# Patient Record
(demographics unavailable — no encounter records)

---

## 2025-02-19 NOTE — DISCUSSION/SUMMARY
[de-identified] : Right elbow region pain  HPI Patient is a 51-year-old female reports to office for evaluation of her pain in her right elbow region since 2/13/2025.  She had blood work drawn on that day where she states the needle was stuck by her biceps area.  This caused severe pain afterwards.  Her right elbow region has been bruised and swollen ever since.  Lifting, range of motion, and palpating certain areas aggravate the patient's pain.  She went to an urgent care where they diagnosed her with phlebitis.  Denies any numbness or tingling.  Right elbow x-ray taken in office today revealed no obvious fractures, subluxations, or dislocations.  No significant abnormalities were seen.  Right elbow exam is as follows: Diffuse ecchymosis of right elbow region noted.  Mild diffuse swelling noted.  No erythema.  Full extension, pronation and supination with pain at extremes.  Limitation in flexion secondary to pain.  Intact distal biceps and distal triceps tendon.  Mild decrease in strength secondary to pain.  Light touch intact throughout.  Neurovascular intact.  Assessment/plan Patient clinically may have phlebitis and tendinitis status post phlebotomy draw.  Right elbow MRI ordered for further evaluation.  Patient was advised to call the office a few days after getting the MRI done to discuss results over the phone.  Bulky Rodriguez dressing was applied to right elbow region for support/stability.  She may transition to an elbow sleeve for nighttime.  Gentle ROM exercises and Epson salt and warm water was encouraged.  OTC Tylenol as needed for pain.  She is on blood thinners and cannot take NSAIDs.  The patient was advised to rest/ice the area and may alternate with warm compresses as needed.  Follow-up in 3 to 4 weeks.  All question/concerns were answered in detail.

## 2025-03-18 NOTE — ASSESSMENT
[FreeTextEntry1] :  The patient is being seen today for her right elbow. She had awful pain at her elbow and a lot of swelling and bruising following a blood draw in the antecubital fossa five weeks ago. She has been improving in therapy, her pain is a 3/10 as opposed to the 10/10 that it was. She used arnica gel with benefit.   left elbow: slight ttp, mild swelling, full ROM, resolving ecchymoses negative Tinel's anywhere throughout the proximal forearm and elbow, neurovascular intact  MRI [facility] right elbow shows antecubital fossa intermuscular edema/fasciitis within the fascial planes of the radial and median nerves which may contribute to regional nerve irritation as a result of recent venipuncture. No drainable fluid collection is identified.   The patient is doing relatively well. We discussed that she had inflammation secondary to the trauma from the blood draw. We discussed my recommendation to continue in therapy as she has been improving. She agrees to do so.  She will follow up as needed.

## 2025-03-18 NOTE — ASSESSMENT
[FreeTextEntry1] :  The patient is being seen today for her right elbow. She had awful pain at her elbow and a lot of swelling and bruising following a blood draw in the antecubital fossa five weeks ago. She has been improving in therapy, her pain is a 3/10 as opposed to the 10/10 that it was. She used arnica gel with benefit.   left elbow: slight ttp, mild swelling, full ROM, resolving ecchymoses negative Tinel's anywhere throughout the proximal forearm and elbow, neurovascular intact  MRI [facility] right elbow shows antecubital fossa intermuscular edema/fasciitis within the fascial planes of the radial and median nerves which may contribute to regional nerve irritation as a result of recent venipuncture. No drainable fluid collection is identified.   The patient is doing relatively well. We discussed that she had inflammation secondary to the trauma from the blood draw. We discussed my recommendation to continue in therapy as she has been improving. She agrees to do so.  She will follow up as needed.